# Patient Record
Sex: FEMALE | Race: WHITE | NOT HISPANIC OR LATINO | Employment: FULL TIME | URBAN - METROPOLITAN AREA
[De-identification: names, ages, dates, MRNs, and addresses within clinical notes are randomized per-mention and may not be internally consistent; named-entity substitution may affect disease eponyms.]

---

## 2021-12-28 ENCOUNTER — CONSULT (OUTPATIENT)
Dept: SURGERY | Facility: CLINIC | Age: 46
End: 2021-12-28
Payer: COMMERCIAL

## 2021-12-28 VITALS
DIASTOLIC BLOOD PRESSURE: 74 MMHG | SYSTOLIC BLOOD PRESSURE: 108 MMHG | HEART RATE: 66 BPM | HEIGHT: 65 IN | WEIGHT: 188 LBS | BODY MASS INDEX: 31.32 KG/M2 | TEMPERATURE: 98.5 F

## 2021-12-28 DIAGNOSIS — K40.90 RIGHT INGUINAL HERNIA: Primary | ICD-10-CM

## 2021-12-28 DIAGNOSIS — K43.9 VENTRAL HERNIA: ICD-10-CM

## 2021-12-28 PROCEDURE — 99244 OFF/OP CNSLTJ NEW/EST MOD 40: CPT | Performed by: SPECIALIST

## 2021-12-28 RX ORDER — THYROID, PORCINE 120 MG/1
120 TABLET ORAL DAILY
COMMUNITY
Start: 2021-11-23

## 2024-11-26 ENCOUNTER — OFFICE VISIT (OUTPATIENT)
Dept: SURGERY | Facility: CLINIC | Age: 49
End: 2024-11-26
Payer: COMMERCIAL

## 2024-11-26 VITALS
HEART RATE: 80 BPM | TEMPERATURE: 98.2 F | BODY MASS INDEX: 24.88 KG/M2 | HEIGHT: 69 IN | OXYGEN SATURATION: 99 % | DIASTOLIC BLOOD PRESSURE: 74 MMHG | WEIGHT: 168 LBS | SYSTOLIC BLOOD PRESSURE: 120 MMHG

## 2024-11-26 DIAGNOSIS — K40.90 RIGHT INGUINAL HERNIA: Primary | ICD-10-CM

## 2024-11-26 DIAGNOSIS — R10.84 GENERALIZED ABDOMINAL PAIN: Primary | ICD-10-CM

## 2024-11-26 DIAGNOSIS — K43.9 SPIGELIAN HERNIA: ICD-10-CM

## 2024-11-26 PROCEDURE — 99214 OFFICE O/P EST MOD 30 MIN: CPT | Performed by: SPECIALIST

## 2024-11-26 RX ORDER — DOXYCYCLINE HYCLATE 100 MG
100 TABLET ORAL 2 TIMES DAILY WITH MEALS
COMMUNITY
Start: 2024-11-12

## 2024-11-26 RX ORDER — TIRZEPATIDE 7.5 MG/.5ML
7.5 INJECTION, SOLUTION SUBCUTANEOUS WEEKLY
COMMUNITY
Start: 2024-11-08

## 2024-11-26 NOTE — PROGRESS NOTES
Chief Complaint: Abdominal wall hernias      History of Present Illness: Patient is a 49-year-old white female who originally seen 3 years ago when she was 46 in regards to multiple abdominal wall hernias.  The patient was scheduled to undergo abdominoplasty.  She had what appeared to be a right inguinal hernia.  She also complained of some bulging in the left upper quadrant.  No scar was noted in that area.  She is status post laparoscopic cholecystectomy.    Unfortunately her scheduled abdominoplasty was postponed due to personal issues.  She is currently pursuing this and she is here today for reevaluation.  She admits to a bulge in the right groin and also this bulge in the left upper quadrant.      Past Medical History: History reviewed. No pertinent past medical history.      Past Surgical History:  History reviewed. No pertinent surgical history.      Allergies:    Allergies   Allergen Reactions    Pollen Extract Itching         Medications:    Current Outpatient Medications:     Lula Thyroid 120 MG tablet, Take 120 mg by mouth daily, Disp: , Rfl:     doxycycline hyclate (VIBRA-TABS) 100 mg tablet, Take 100 mg by mouth 2 (two) times a day with meals, Disp: , Rfl:     Zepbound 7.5 MG/0.5ML auto-injector, Inject 7.5 mg under the skin once a week, Disp: , Rfl:       Social History:  Social History     Social History     Substance and Sexual Activity   Alcohol Use Yes    Alcohol/week: 1.0 standard drink of alcohol    Types: 1 Cans of beer per week    Comment: social     Social History     Substance and Sexual Activity   Drug Use Never     Social History     Tobacco Use   Smoking Status Former    Types: Cigarettes   Smokeless Tobacco Never         Family History:  History reviewed. No pertinent family history.      Review of Systems:    -12 systems reviewed    Vitals:  Vitals:    11/26/24 1253   BP: 120/74   Pulse: 80   Temp: 98.2 °F (36.8 °C)   SpO2: 99%       Physical Exam:  Middle-aged white female 5 foot 8  inches 168 pounds.  She is awake alert no distress.    Vital signs as above    Abdomen soft flat there appears to be a right inguinal hernia present.  There also has a subtle bulge in the left upper quadrant but no scar.  No umbilical hernia no left inguinal hernia.      Lab Results: I have personally reviewed pertinent reports. See below.  Imaging: I have personally reviewed pertinent reports.   EKG, Pathology, and Other Studies: I have personally reviewed pertinent reports.     No visits with results within 1 Day(s) from this visit.   Latest known visit with results is:   No results found for any previous visit.         Impression:  Patient preop for abdominoplasty with history of abdominal wall hernias.  Right inguinal hernias noted.?  Left abdominal wall hernia.  Recently we had a patient with a similar situation.  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    Plan:  At this point we will obtain a CAT scan of the abdomen and pelvis to evaluate the abdominal wall for any additional hernias.  After we obtain this then we will be available for her abdominoplasty and repairing hernias at that time.  She understands.

## 2024-12-04 ENCOUNTER — HOSPITAL ENCOUNTER (OUTPATIENT)
Dept: CT IMAGING | Facility: CLINIC | Age: 49
Discharge: HOME | End: 2024-12-04
Payer: COMMERCIAL

## 2024-12-04 DIAGNOSIS — R10.84 GENERALIZED ABDOMINAL PAIN: ICD-10-CM

## 2024-12-04 PROCEDURE — 74177 CT ABD & PELVIS W/CONTRAST: CPT

## 2024-12-04 RX ADMIN — IOHEXOL 50 ML: 350 INJECTION, SOLUTION INTRAVENOUS at 11:30
